# Patient Record
Sex: MALE | Race: BLACK OR AFRICAN AMERICAN | NOT HISPANIC OR LATINO | Employment: FULL TIME | URBAN - METROPOLITAN AREA
[De-identification: names, ages, dates, MRNs, and addresses within clinical notes are randomized per-mention and may not be internally consistent; named-entity substitution may affect disease eponyms.]

---

## 2023-11-21 DIAGNOSIS — E78.5 HYPERLIPIDEMIA, UNSPECIFIED: ICD-10-CM

## 2023-11-22 RX ORDER — ATORVASTATIN CALCIUM 20 MG/1
20 TABLET, FILM COATED ORAL DAILY
Qty: 90 TABLET | Refills: 3 | Status: SHIPPED | OUTPATIENT
Start: 2023-11-22 | End: 2024-04-25 | Stop reason: SDUPTHER

## 2024-02-16 DIAGNOSIS — I10 ESSENTIAL (PRIMARY) HYPERTENSION: ICD-10-CM

## 2024-02-26 NOTE — TELEPHONE ENCOUNTER
Copied from CRM #[id number]. Topic: Information Request - Prescription Refill FAQ  >> Feb 22, 2024  4:00 PM Feliberto ALMONTE wrote:  Information has been provided to Patient.  Blood pressure medication refilled out of them. Patient is scheduled on [id number] for next appointment with Dr. Crawford

## 2024-02-28 RX ORDER — HYDROCHLOROTHIAZIDE 25 MG/1
25 TABLET ORAL DAILY
Qty: 90 TABLET | Refills: 3 | Status: SHIPPED | OUTPATIENT
Start: 2024-02-28 | End: 2024-04-25

## 2024-02-28 RX ORDER — HYDROCHLOROTHIAZIDE 25 MG/1
25 TABLET ORAL DAILY
COMMUNITY
End: 2024-02-28 | Stop reason: SDUPTHER

## 2024-02-28 NOTE — TELEPHONE ENCOUNTER
Pt called requesting follow-up of refill request sent to clinic. Noted request forwarded to primary on 2/22/24. Order routed to attending with message.

## 2024-04-25 ENCOUNTER — OFFICE VISIT (OUTPATIENT)
Dept: PRIMARY CARE | Facility: CLINIC | Age: 54
End: 2024-04-25
Payer: COMMERCIAL

## 2024-04-25 VITALS
HEART RATE: 88 BPM | TEMPERATURE: 97.1 F | DIASTOLIC BLOOD PRESSURE: 97 MMHG | WEIGHT: 300.1 LBS | RESPIRATION RATE: 18 BRPM | BODY MASS INDEX: 44.45 KG/M2 | OXYGEN SATURATION: 93 % | HEIGHT: 69 IN | SYSTOLIC BLOOD PRESSURE: 134 MMHG

## 2024-04-25 DIAGNOSIS — E78.5 HYPERLIPIDEMIA, UNSPECIFIED: ICD-10-CM

## 2024-04-25 DIAGNOSIS — I10 HYPERTENSION, UNSPECIFIED TYPE: Primary | ICD-10-CM

## 2024-04-25 DIAGNOSIS — Z00.00 ROUTINE GENERAL MEDICAL EXAMINATION AT A HEALTH CARE FACILITY: ICD-10-CM

## 2024-04-25 PROCEDURE — 99214 OFFICE O/P EST MOD 30 MIN: CPT

## 2024-04-25 PROCEDURE — 3075F SYST BP GE 130 - 139MM HG: CPT

## 2024-04-25 PROCEDURE — 99396 PREV VISIT EST AGE 40-64: CPT

## 2024-04-25 PROCEDURE — 3080F DIAST BP >= 90 MM HG: CPT

## 2024-04-25 RX ORDER — CHLORTHALIDONE 25 MG/1
25 TABLET ORAL DAILY
Qty: 30 TABLET | Refills: 2 | Status: SHIPPED | OUTPATIENT
Start: 2024-04-25 | End: 2024-10-22

## 2024-04-25 RX ORDER — ATORVASTATIN CALCIUM 40 MG/1
40 TABLET, FILM COATED ORAL DAILY
Qty: 90 TABLET | Refills: 3 | Status: SHIPPED | OUTPATIENT
Start: 2024-04-25

## 2024-04-25 ASSESSMENT — PAIN SCALES - GENERAL: PAINLEVEL: 0-NO PAIN

## 2024-04-25 NOTE — PROGRESS NOTES
Subjective   Patient ID: Manuelito Thrasher is a 54 y.o. male with PMH of htn and hld who presents for Follow-up.    # Health Maintenance  - Last prior HM: > 1 year  - Patient's rating of their own health: Good  - Dental Care: last prior dental visit 1 year ago   - Vision: last prior ophtho visit 1 year ago // corrective devices: glasses // recent vision: no changes  - Hearing: denies recent hearing loss   - Diet: typical day: staying away from red meat, cut down on cheeseburgers to maybe once a month, increased fish and chicken with mixed vegetables, but should increase fruits; no soda or juice, cut down on beer  - Exercise: weight training daily for 30 mins  - Weight: stable, had increased weight since last visit,but has decreased recently   - Smoking: never a smoker  Tobacco Use: Low Risk  (4/25/2024)    Patient History     Smoking Tobacco Use: Never     Smokeless Tobacco Use: Never     Passive Exposure: Not on file   - EtOH: Alcohol Use: Frequency: 2 beers once a week.  - Illicit substances: denied.  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 No      HPI  Hypertension  BP Readings from Last 3 Encounters:   04/25/24 (!) 135/91   11/23/22 138/83   05/03/22 135/79     - meds: hydrochlorothiazide 25 mg daily  - missing doses: denies, had a week-two without refills but otherwise doesn't miss  - taking BP at home?: 130s/90s consistently  - smoking: no  - denies HA, chest pains, SOB, LE edema, vision changes    FamHx:  No family history on file.    PMH:  There is no problem list on file for this patient.    Past Medical History:   Diagnosis Date    Personal history of urinary (tract) infections 05/09/2019    History of urinary tract infection       SurgHx:  No past surgical history on file.    Meds:  Current Outpatient Medications on File Prior to Visit  "  Medication Sig    atorvastatin (Lipitor) 20 mg tablet TAKE 1 TABLET BY MOUTH EVERY DAY AS DIRECTED    hydroCHLOROthiazide (HYDRODiuril) 25 mg tablet TAKE 1 TABLET BY MOUTH EVERY DAY     No current facility-administered medications on file prior to visit.        Patient care team:  Patient Care Team:  Adan Crawford MD as PCP - General  Mike Bazzi MD as PCP - [facility] PCP     Objective     Vitals: BP (!) 135/91 (BP Location: Left arm, Patient Position: Sitting, BP Cuff Size: Adult)   Pulse 88   Temp 36.2 °C (97.1 °F) (Temporal)   Resp 18   Ht 1.753 m (5' 9\")   Wt 136 kg (300 lb 1.6 oz)   SpO2 93%   BMI 44.32 kg/m²    Physical Exam  General:  Well developed. Alert. No acute distress.  Skin:  Warm, dry. normal skin turgor. no rashes. no lesions.   Head: NCAT  EENT: MMM  Cardiovascular:  Regular rate and rhythm, normal S1 and S2, no gallops, no murmurs and no pericardial rub.  Pulmonary:  CTAB in all fields  Abdomen:  (+) BS. soft. non-tender. non-distended. no abdominal masses. no guarding or rigidity.  Neurologic:  grossly intact  Musculoskeletal: moves all extremities spontaneously  Psychiatric:  appropriate mood and affect  PHQ2: negative   Food insecurity: negative    Assessment/Plan     # Routine Health Maintenance  - Flu vaccine: recommended annually  - COVID vaccine: recommended completion of primary series and recommended boosters  - Tdap: next due 2026  - Lifetime HIV, HepC: declines, states previously negative, has no current concern  - Lipid Panel: will re-check today  - DM screening: A1c ordered today  - HTN screening: DBP >90 today, SBP at goal  - Food Insecurity screen: negative  - Depression: PHQ-2 negative  - Colonoscopy (45-75): done 2022, recommended repeat in 2027      Assessment/Plan   53 yo M presenting for follow-up. BP mildly elevated, in 130s/90s, consistent with home BPs. As only mild elevation above goal BP, will switch hct to chlorthalidone at same dose, rather than initiating " 2nd agent. Will continue home measurements.     Hypertension, unspecified type  -     Hemoglobin A1c; Future  -     Lipid panel; Future  -     Comprehensive metabolic panel; Future  -     Vitamin D 25-Hydroxy,Total (for eval of Vitamin D levels); Future  -     chlorthalidone (Hygroton) 25 mg tablet; Take 1 tablet (25 mg) by mouth once daily.    Hyperlipidemia, unspecified  -  ASCVD risk 7.3%, and tolerating current 20 mg dose  -     will increase atorvastatin to 40 mg tablet    RTC in 1 mo, or earlier as needed.  Attending Supervision: seen and discussed with attending physician Dr. Mike Bazzi.  Yaima Galan MD  PGY-2, Family Medicine.

## 2024-05-01 ENCOUNTER — LAB (OUTPATIENT)
Dept: LAB | Facility: LAB | Age: 54
End: 2024-05-01
Payer: COMMERCIAL

## 2024-05-01 DIAGNOSIS — I10 HYPERTENSION, UNSPECIFIED TYPE: ICD-10-CM

## 2024-05-01 DIAGNOSIS — E55.9 VITAMIN D DEFICIENCY: Primary | ICD-10-CM

## 2024-05-01 LAB
25(OH)D3 SERPL-MCNC: <6 NG/ML (ref 30–100)
ALBUMIN SERPL BCP-MCNC: 4.7 G/DL (ref 3.4–5)
ALP SERPL-CCNC: 75 U/L (ref 33–120)
ALT SERPL W P-5'-P-CCNC: 36 U/L (ref 10–52)
ANION GAP SERPL CALC-SCNC: 17 MMOL/L (ref 10–20)
AST SERPL W P-5'-P-CCNC: 30 U/L (ref 9–39)
BILIRUB SERPL-MCNC: 0.7 MG/DL (ref 0–1.2)
BUN SERPL-MCNC: 16 MG/DL (ref 6–23)
CALCIUM SERPL-MCNC: 10.2 MG/DL (ref 8.6–10.6)
CHLORIDE SERPL-SCNC: 101 MMOL/L (ref 98–107)
CHOLEST SERPL-MCNC: 190 MG/DL (ref 0–199)
CHOLESTEROL/HDL RATIO: 3.5
CO2 SERPL-SCNC: 27 MMOL/L (ref 21–32)
CREAT SERPL-MCNC: 1.12 MG/DL (ref 0.5–1.3)
EGFRCR SERPLBLD CKD-EPI 2021: 77 ML/MIN/1.73M*2
EST. AVERAGE GLUCOSE BLD GHB EST-MCNC: 108 MG/DL
GLUCOSE SERPL-MCNC: 131 MG/DL (ref 74–99)
HBA1C MFR BLD: 5.4 %
HDLC SERPL-MCNC: 54.2 MG/DL
LDLC SERPL CALC-MCNC: 96 MG/DL
NON HDL CHOLESTEROL: 136 MG/DL (ref 0–149)
POTASSIUM SERPL-SCNC: 4.2 MMOL/L (ref 3.5–5.3)
PROT SERPL-MCNC: 8.1 G/DL (ref 6.4–8.2)
SODIUM SERPL-SCNC: 141 MMOL/L (ref 136–145)
TRIGL SERPL-MCNC: 200 MG/DL (ref 0–149)
VLDL: 40 MG/DL (ref 0–40)

## 2024-05-01 PROCEDURE — 36415 COLL VENOUS BLD VENIPUNCTURE: CPT

## 2024-05-01 PROCEDURE — 80053 COMPREHEN METABOLIC PANEL: CPT

## 2024-05-01 PROCEDURE — 82306 VITAMIN D 25 HYDROXY: CPT

## 2024-05-01 PROCEDURE — 83036 HEMOGLOBIN GLYCOSYLATED A1C: CPT

## 2024-05-01 PROCEDURE — 80061 LIPID PANEL: CPT

## 2024-05-02 NOTE — PROGRESS NOTES
I saw and evaluated the patient. I personally obtained the key and critical portions of the history and physical exam or was physically present for key and critical portions performed by the resident/fellow. I reviewed the resident/fellow's documentation and discussed the patient with the resident/fellow. I agree with the resident/fellow's medical decision making as documented in the note.    Mike Bazzi MD

## 2024-05-09 RX ORDER — ERGOCALCIFEROL 1.25 MG/1
50000 CAPSULE ORAL
Qty: 12 CAPSULE | Refills: 0 | Status: SHIPPED | OUTPATIENT
Start: 2024-05-12 | End: 2024-08-04

## 2024-05-30 ENCOUNTER — APPOINTMENT (OUTPATIENT)
Dept: PRIMARY CARE | Facility: HOSPITAL | Age: 54
End: 2024-05-30
Payer: COMMERCIAL

## 2024-06-24 ENCOUNTER — APPOINTMENT (OUTPATIENT)
Dept: PRIMARY CARE | Facility: CLINIC | Age: 54
End: 2024-06-24
Payer: COMMERCIAL

## 2024-06-24 VITALS
HEIGHT: 69 IN | DIASTOLIC BLOOD PRESSURE: 93 MMHG | HEART RATE: 82 BPM | WEIGHT: 283.6 LBS | SYSTOLIC BLOOD PRESSURE: 144 MMHG | BODY MASS INDEX: 42 KG/M2 | TEMPERATURE: 98.2 F | OXYGEN SATURATION: 98 % | RESPIRATION RATE: 18 BRPM

## 2024-06-24 DIAGNOSIS — R25.2 LEG CRAMPS: ICD-10-CM

## 2024-06-24 DIAGNOSIS — E55.9 VITAMIN D DEFICIENCY: Primary | ICD-10-CM

## 2024-06-24 PROCEDURE — 99213 OFFICE O/P EST LOW 20 MIN: CPT

## 2024-06-24 PROCEDURE — 1036F TOBACCO NON-USER: CPT

## 2024-06-24 RX ORDER — VIT C/E/ZN/COPPR/LUTEIN/ZEAXAN 250MG-90MG
25 CAPSULE ORAL DAILY
Qty: 90 CAPSULE | Refills: 3 | Status: SHIPPED | OUTPATIENT
Start: 2024-06-24 | End: 2025-06-24

## 2024-06-24 ASSESSMENT — ENCOUNTER SYMPTOMS
HEADACHES: 0
LIGHT-HEADEDNESS: 0
SHORTNESS OF BREATH: 0
DIZZINESS: 0

## 2024-06-24 ASSESSMENT — PAIN SCALES - GENERAL: PAINLEVEL: 0-NO PAIN

## 2024-06-24 NOTE — PROGRESS NOTES
Attestation: I discussed the patient with the attendee, and reviewed the attendee's documentation. I agree with the attendee's medical decision making and plans as documented in the attendee's note.      Checo Naik MD  FM & PM Resident

## 2024-06-24 NOTE — PROGRESS NOTES
"Subjective   Patient ID:   Manuelito Thrasher is a 54 y.o. male who presents for Follow-up (Blood test).  HPI    #B/L Leg Cramps  - Reports cramps in his legs 2 days after starting vitamin D supplement  - Has cramps twice a week and last 10-20 minutes  - Starts around ankle and can progress up to his calves and thighs  - Reports sometimes it can become very painful at times  - Reports taking electrolyte supplements which he mixed in water which helps  - Has also been doing exercises which helps relief the cramps  - Denied any previous muscle pain with Atorvastatin in the past  - Reported he has taken the vitamin D supplement for 8 week and has 4 weeks left but would like to stop it to see if that improves his symptoms    #HTN  - Has a BP cuff at home  - Checks BP every day at home and work  - Average readings at home 120s/80s and at work it is 140s/90s  - Reports he is stressed at work, 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   Respiratory:  Negative for shortness of breath.    Cardiovascular:  Negative for chest pain.   Neurological:  Negative for dizziness, light-headedness and headaches.       Objective   BP (!) 144/93 (BP Location: Left arm, Patient Position: Sitting, BP Cuff Size: Adult)   Pulse 82   Temp 36.8 °C (98.2 °F) (Temporal)   Resp 18   Ht 1.753 m (5' 9\")   Wt 129 kg (283 lb 9.6 oz)   SpO2 98%   BMI 41.88 kg/m²    Physical Exam  Constitutional:       General: He is not in acute distress.     Appearance: Normal appearance. He is not ill-appearing.   HENT:      Head: Normocephalic and atraumatic.   Eyes:      Extraocular Movements: Extraocular movements intact.   Cardiovascular:      Rate and Rhythm: Normal rate and regular rhythm.      Pulses: Normal pulses.      Heart sounds: " Normal heart sounds. No murmur heard.     No friction rub. No gallop.   Pulmonary:      Effort: Pulmonary effort is normal. No respiratory distress.      Breath sounds: Normal breath sounds. No stridor. No wheezing, rhonchi or rales.   Musculoskeletal:      Cervical back: Normal range of motion.      Right lower leg: Edema present.      Left lower leg: Edema present.      Comments: Trace edema in B/L LE   Skin:     General: Skin is warm and dry.   Neurological:      General: No focal deficit present.      Mental Status: He is alert.   Psychiatric:         Mood and Affect: Mood normal.         Behavior: Behavior normal.         Assessment/Plan     Problem List Items Addressed This Visit    None  Manuelito Thrasher is a 54 year old male with a PMHx of HTN, HLD, & obesity who presents to the clinic for lab leg cramps.    #B/L LE cramps  - Recent increase in Atorvastatin around cramp onset, ordered CK  - Ordered TSH w/ reflex & RFP  - Can discontinue weekly vitamin D supplements and start daily supplements  - Continue with exercises and electrolyte supplements for symptom relief    #Vitamin D deficiency  - Completed 8 weeks of 50,000U Ergocalciferol, can discontinue and start daily supplements  - Daily Vitamin D supplements sent to pharmacy on file    #HTN  - IO /93  - c/w Chlorthalidone 25 mg QD  - Encouraged patient to continue checking BP regularly and keep a log to bring to his next visit  - Encouraged to continue with DASH diet  - Can consider increasing dose of chlorthalidone if BP remains elevated or switch to an ACE/ARB if potassium is low    RTC in 6-8 weeks for follow up        The patient was discussed with Dr. Loya.    Adan Crawford MD  Family Medicine   PGY-2

## 2024-06-25 NOTE — PROGRESS NOTES
I reviewed the resident/fellow's documentation and discussed the patient with the resident/fellow. I agree with the resident/fellow's medical decision making as documented in the note.   VS noted->  BP above goal in clinic and apparently at work, but normal at home.  Continue to monitor and consider adding another agent if warranted.  Continue to encourage healthy diet and exercise to improve BP and BMI.    Hilary Loya MD

## 2024-07-19 DIAGNOSIS — I10 HYPERTENSION, UNSPECIFIED TYPE: ICD-10-CM

## 2024-07-21 RX ORDER — CHLORTHALIDONE 25 MG/1
25 TABLET ORAL DAILY
Qty: 90 TABLET | Refills: 1 | Status: SHIPPED | OUTPATIENT
Start: 2024-07-21

## 2025-01-21 DIAGNOSIS — I10 HYPERTENSION, UNSPECIFIED TYPE: ICD-10-CM

## 2025-01-21 RX ORDER — CHLORTHALIDONE 25 MG/1
25 TABLET ORAL DAILY
Qty: 90 TABLET | Refills: 1 | Status: SHIPPED | OUTPATIENT
Start: 2025-01-21

## 2025-04-18 DIAGNOSIS — E78.5 HYPERLIPIDEMIA, UNSPECIFIED: ICD-10-CM

## 2025-04-25 RX ORDER — ATORVASTATIN CALCIUM 40 MG/1
40 TABLET, FILM COATED ORAL DAILY
Qty: 90 TABLET | Refills: 3 | Status: SHIPPED | OUTPATIENT
Start: 2025-04-25

## 2025-05-27 ENCOUNTER — APPOINTMENT (OUTPATIENT)
Facility: HOSPITAL | Age: 55
End: 2025-05-27

## 2025-05-27 ASSESSMENT — PROMIS GLOBAL HEALTH SCALE
RATE_GENERAL_HEALTH: VERY GOOD
CARRYOUT_SOCIAL_ACTIVITIES: EXCELLENT
RATE_MENTAL_HEALTH: GOOD
RATE_PHYSICAL_HEALTH: GOOD
RATE_AVERAGE_FATIGUE: MODERATE
RATE_AVERAGE_PAIN: 1
RATE_SOCIAL_SATISFACTION: VERY GOOD
EMOTIONAL_PROBLEMS: SOMETIMES
RATE_QUALITY_OF_LIFE: GOOD
CARRYOUT_PHYSICAL_ACTIVITIES: COMPLETELY

## 2025-06-13 ENCOUNTER — OFFICE VISIT (OUTPATIENT)
Facility: HOSPITAL | Age: 55
End: 2025-06-13
Payer: COMMERCIAL

## 2025-06-13 VITALS
RESPIRATION RATE: 18 BRPM | HEIGHT: 69 IN | HEART RATE: 81 BPM | BODY MASS INDEX: 40.14 KG/M2 | DIASTOLIC BLOOD PRESSURE: 91 MMHG | OXYGEN SATURATION: 96 % | WEIGHT: 271 LBS | SYSTOLIC BLOOD PRESSURE: 137 MMHG | TEMPERATURE: 97.4 F

## 2025-06-13 DIAGNOSIS — E78.5 HYPERLIPIDEMIA, UNSPECIFIED: ICD-10-CM

## 2025-06-13 DIAGNOSIS — E66.813 CLASS 3 SEVERE OBESITY WITH SERIOUS COMORBIDITY AND BODY MASS INDEX (BMI) OF 40.0 TO 44.9 IN ADULT, UNSPECIFIED OBESITY TYPE: ICD-10-CM

## 2025-06-13 DIAGNOSIS — E55.9 VITAMIN D DEFICIENCY: ICD-10-CM

## 2025-06-13 DIAGNOSIS — Z00.00 HEALTHCARE MAINTENANCE: Primary | ICD-10-CM

## 2025-06-13 DIAGNOSIS — I10 HYPERTENSION, UNSPECIFIED TYPE: ICD-10-CM

## 2025-06-13 DIAGNOSIS — Z78.9 NEVER SMOKED TOBACCO: ICD-10-CM

## 2025-06-13 DIAGNOSIS — Z23 ENCOUNTER FOR IMMUNIZATION: ICD-10-CM

## 2025-06-13 DIAGNOSIS — E66.813 CLASS 3 SEVERE OBESITY DUE TO EXCESS CALORIES WITHOUT SERIOUS COMORBIDITY WITH BODY MASS INDEX (BMI) OF 40.0 TO 44.9 IN ADULT: ICD-10-CM

## 2025-06-13 PROBLEM — R51.9 HEADACHE: Status: ACTIVE | Noted: 2025-06-13

## 2025-06-13 PROBLEM — R35.89 POLYURIA: Status: ACTIVE | Noted: 2025-06-13

## 2025-06-13 PROBLEM — H61.21 IMPACTED CERUMEN OF RIGHT EAR: Status: ACTIVE | Noted: 2025-06-13

## 2025-06-13 PROBLEM — E66.9 OBESITY: Status: ACTIVE | Noted: 2025-06-13

## 2025-06-13 PROBLEM — K59.00 CONSTIPATION: Status: ACTIVE | Noted: 2025-06-13

## 2025-06-13 RX ORDER — ATORVASTATIN CALCIUM 40 MG/1
40 TABLET, FILM COATED ORAL DAILY
Qty: 90 TABLET | Refills: 3 | Status: SHIPPED | OUTPATIENT
Start: 2025-06-13

## 2025-06-13 RX ORDER — VIT C/E/ZN/COPPR/LUTEIN/ZEAXAN 250MG-90MG
25 CAPSULE ORAL DAILY
Qty: 90 CAPSULE | Refills: 3 | Status: SHIPPED | OUTPATIENT
Start: 2025-06-13 | End: 2026-06-13

## 2025-06-13 RX ORDER — CHLORTHALIDONE 25 MG/1
25 TABLET ORAL DAILY
Qty: 90 TABLET | Refills: 1 | Status: SHIPPED | OUTPATIENT
Start: 2025-06-13

## 2025-06-13 ASSESSMENT — PAIN SCALES - GENERAL: PAINLEVEL_OUTOF10: 0-NO PAIN

## 2025-06-13 NOTE — PROGRESS NOTES
"Subjective   Patient ID: Manuelito Trhasher is a 55 y.o. male with PMH of HTN, HLD, and vitamin D deficiency who presents for Med Refill and Annual Exam (paperwork).    # Health Maintenance  - Last prior HM: over a year ago  - Patient's rating of their own health: Fair  - Dental Care: last prior dental visit 2 years ago // brushes teeth daily // flosses teeth yes // denies current tooth pain  - Vision: corrective devices: glasses // recent vision: 3 years ago  - Hearing: denies recent hearing loss   - Diet: Appetite has been good, diet needs work  - Exercise: walking and going to a gym   - Weight: stable,   - Smoking: never a smoker  - EtOH: Alcohol Use: Yes, patient drinks alcohol. 1 beer per day  - Illicit substances: denied.  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 Yes, describe: maternal uncle    MEN  - Prostate CA: last prior PSA in 2021  - Not currently sexually. Last active over 2 years ago  - Declined STI screening     HPI  #Employment form  - Needs a employment physical paperwork completed    #HTN  - Has BP cuff at home and checks daily  - average readings are in the 120-130s/70-90s  - Has been compliant with his medications  - Needs refills on chlorthalidone    #HLD  #Vitamin D deficiency  - Requested refills on atorvastatin & Vitamin D supplements    Current Outpatient Medications   Medication Instructions    atorvastatin (LIPITOR) 40 mg, oral, Daily    chlorthalidone (HYGROTON) 25 mg, oral, Daily    cholecalciferol (VITAMIN D-3) 25 mcg, oral, Daily       Objective     Vitals: BP (!) 137/91 (BP Location: Right arm, Patient Position: Sitting, BP Cuff Size: Adult)   Pulse 81   Temp 36.3 °C (97.4 °F) (Temporal)   Resp 18   Ht 1.753 m (5' 9\")   Wt 123 kg (271 lb)   SpO2 96%   BMI 40.02 kg/m²    Physical Exam  Constitutional:       General: He is not in acute " distress.     Appearance: Normal appearance. He is obese. He is not ill-appearing.   HENT:      Head: Normocephalic and atraumatic.   Eyes:      Extraocular Movements: Extraocular movements intact.      Conjunctiva/sclera: Conjunctivae normal.   Cardiovascular:      Rate and Rhythm: Normal rate and regular rhythm.      Heart sounds: Normal heart sounds. No murmur heard.     No friction rub. No gallop.   Pulmonary:      Effort: Pulmonary effort is normal. No respiratory distress.      Breath sounds: Normal breath sounds. No wheezing.   Abdominal:      General: There is no distension.      Palpations: Abdomen is soft.      Tenderness: There is no abdominal tenderness. There is no guarding.   Musculoskeletal:         General: Normal range of motion.      Cervical back: Normal range of motion.      Right lower leg: No edema.      Left lower leg: No edema.   Neurological:      Mental Status: He is alert.   Psychiatric:         Mood and Affect: Mood normal.         Behavior: Behavior normal.         PHQ9: score of 5 with no SI. Not interested in speaking with a therapist at this time       Food insecurity: Answered no  Food Insecurity: Not on file       Assessment/Plan   Manuelito Thrasher is a 55 y.o. male with PMH of HTN, HLD, and vitamin D deficiency who presents for Med Refill and Annual Exam (paperwork).    # Routine Health Maintenance  Immunization History   Administered Date(s) Administered    COVID-19, mRNA, LNP-S, PF, 30 mcg/0.3 mL dose 03/26/2021, 04/16/2021    Flu vaccine (IIV4), preservative free *Check age/dose* 10/18/2023    Flu vaccine, quadrivalent, no egg protein, age 6 month or greater (FLUCELVAX) 09/21/2022    Pfizer Gray Cap SARS-CoV-2 03/12/2022    Pneumococcal conjugate vaccine, 20-valent (PREVNAR 20) 06/13/2025     - Flu vaccine: recommended annually,   - COVID vaccine: recommended completion of primary series and recommended boosters,   - Prevnar (PCV20): Given today  - Tdap: next due 11/2026  -  Shingrix (50+): Recommend he receive it at a pharmacy  - STI screen: Declined GC, chlamydia, trich, syphilis, HIV, HepC  - Lifetime HepC: Ordered  - Lipid Panel (35M,45F): Ordered  - DM screening: Ordered  - Ordered CMP  - HTN screening: wnl today  - Food Insecurity screen: Negative  - Depression: PHQ-9 score of 5 indicating mild depression with no SI  - Tobacco Cessation: Never smoker  - Last Dental: recommended follow up every 6mo   - Last Eye exam: recommended follow up annually   - Colonoscopy (45-75): up to date  - AAA screening (65-75M): N/A  - PSA screening (55-69M): N/A  - Lung CA screening (50-80, >20py): N/A    #HTN  - IO /91, mildly high  - Encouraged to check his BP regularly at home and keep a log to bring to his next visit  - Refilled Chlorthalidone 25 mg daily    #HLD  - Refilled Atorvastatin 40 mg daily    #Vitamin D deficiency  - Refilled Vitamin D-3 25 mcg daily  - Ordered updated Vitamin D level    Problem List Items Addressed This Visit       Hypertension    Relevant Medications    chlorthalidone (Hygroton) 25 mg tablet    Obesity    Relevant Orders    Comprehensive metabolic panel    Hemoglobin A1c    Lipid panel     Other Visit Diagnoses         Healthcare maintenance    -  Primary    Relevant Orders    Hepatitis C antibody    Comprehensive metabolic panel    Lipid panel      Vitamin D deficiency        Relevant Medications    cholecalciferol (Vitamin D-3) 25 mcg (1,000 units) capsule    Other Relevant Orders    Vitamin D 25-Hydroxy,Total (for eval of Vitamin D levels)      Hyperlipidemia, unspecified        Relevant Medications    atorvastatin (Lipitor) 40 mg tablet    Other Relevant Orders    Hemoglobin A1c    Lipid panel      Never smoked tobacco                Attending Supervision: discussed with attending physician (cosigner listed on this note).    RTC in 3 months for a follow up, or earlier as needed.    Reviewed and approved by ELLEN ALEXANDER on 6/13/25 at 12:36 PM.

## 2025-06-14 LAB
25(OH)D3+25(OH)D2 SERPL-MCNC: 24 NG/ML (ref 30–100)
ALBUMIN SERPL-MCNC: 4.7 G/DL (ref 3.6–5.1)
ALP SERPL-CCNC: 72 U/L (ref 35–144)
ALT SERPL-CCNC: 44 U/L (ref 9–46)
ANION GAP SERPL CALCULATED.4IONS-SCNC: 12 MMOL/L (CALC) (ref 7–17)
AST SERPL-CCNC: 44 U/L (ref 10–35)
BILIRUB SERPL-MCNC: 0.9 MG/DL (ref 0.2–1.2)
BUN SERPL-MCNC: 13 MG/DL (ref 7–25)
CALCIUM SERPL-MCNC: 10.1 MG/DL (ref 8.6–10.3)
CHLORIDE SERPL-SCNC: 100 MMOL/L (ref 98–110)
CHOLEST SERPL-MCNC: 175 MG/DL
CHOLEST/HDLC SERPL: 3.5 (CALC)
CO2 SERPL-SCNC: 25 MMOL/L (ref 20–32)
CREAT SERPL-MCNC: 1.17 MG/DL (ref 0.7–1.3)
EGFRCR SERPLBLD CKD-EPI 2021: 73 ML/MIN/1.73M2
EST. AVERAGE GLUCOSE BLD GHB EST-MCNC: 117 MG/DL
EST. AVERAGE GLUCOSE BLD GHB EST-SCNC: 6.5 MMOL/L
GLUCOSE SERPL-MCNC: 114 MG/DL (ref 65–99)
HBA1C MFR BLD: 5.7 %
HCV AB SERPL QL IA: NORMAL
HDLC SERPL-MCNC: 50 MG/DL
LDLC SERPL CALC-MCNC: 93 MG/DL (CALC)
NONHDLC SERPL-MCNC: 125 MG/DL (CALC)
POTASSIUM SERPL-SCNC: 4.2 MMOL/L (ref 3.5–5.3)
PROT SERPL-MCNC: 7.7 G/DL (ref 6.1–8.1)
SODIUM SERPL-SCNC: 137 MMOL/L (ref 135–146)
TRIGL SERPL-MCNC: 231 MG/DL

## 2025-07-10 DIAGNOSIS — E78.5 HYPERLIPIDEMIA, UNSPECIFIED: ICD-10-CM

## 2025-07-16 RX ORDER — ATORVASTATIN CALCIUM 40 MG/1
40 TABLET, FILM COATED ORAL DAILY
Qty: 90 TABLET | Refills: 0 | Status: SHIPPED | OUTPATIENT
Start: 2025-07-16